# Patient Record
Sex: MALE | Race: WHITE
[De-identification: names, ages, dates, MRNs, and addresses within clinical notes are randomized per-mention and may not be internally consistent; named-entity substitution may affect disease eponyms.]

---

## 2018-07-05 ENCOUNTER — HOSPITAL ENCOUNTER (INPATIENT)
Dept: HOSPITAL 12 - SRC | Age: 23
LOS: 7 days | Discharge: HOME | DRG: 895 | End: 2018-07-12
Attending: INTERNAL MEDICINE | Admitting: INTERNAL MEDICINE
Payer: COMMERCIAL

## 2018-07-05 VITALS — DIASTOLIC BLOOD PRESSURE: 60 MMHG | SYSTOLIC BLOOD PRESSURE: 110 MMHG

## 2018-07-05 VITALS — DIASTOLIC BLOOD PRESSURE: 61 MMHG | SYSTOLIC BLOOD PRESSURE: 105 MMHG

## 2018-07-05 VITALS — WEIGHT: 180 LBS | HEIGHT: 71 IN | BODY MASS INDEX: 25.2 KG/M2

## 2018-07-05 DIAGNOSIS — F32.9: ICD-10-CM

## 2018-07-05 DIAGNOSIS — R94.31: ICD-10-CM

## 2018-07-05 DIAGNOSIS — F11.23: Primary | ICD-10-CM

## 2018-07-05 DIAGNOSIS — L30.9: ICD-10-CM

## 2018-07-05 DIAGNOSIS — F17.210: ICD-10-CM

## 2018-07-05 DIAGNOSIS — F41.0: ICD-10-CM

## 2018-07-05 DIAGNOSIS — F13.230: ICD-10-CM

## 2018-07-05 DIAGNOSIS — I07.1: ICD-10-CM

## 2018-07-05 DIAGNOSIS — E87.6: ICD-10-CM

## 2018-07-05 DIAGNOSIS — E86.0: ICD-10-CM

## 2018-07-05 DIAGNOSIS — Z81.1: ICD-10-CM

## 2018-07-05 DIAGNOSIS — R00.1: ICD-10-CM

## 2018-07-05 LAB
ALP SERPL-CCNC: 67 U/L (ref 50–136)
ALT SERPL W/O P-5'-P-CCNC: 49 U/L (ref 16–63)
AMPHETAMINES UR QL SCN>1000 NG/ML: NEGATIVE
AST SERPL-CCNC: 21 U/L (ref 15–37)
BARBITURATES UR QL SCN: NEGATIVE
BASOPHILS # BLD AUTO: 0 K/UL (ref 0–8)
BASOPHILS NFR BLD AUTO: 0.5 % (ref 0–2)
BILIRUB SERPL-MCNC: 0.5 MG/DL (ref 0.2–1)
BUN SERPL-MCNC: 27 MG/DL (ref 7–18)
CHLORIDE SERPL-SCNC: 104 MMOL/L (ref 98–107)
CO2 SERPL-SCNC: 28 MMOL/L (ref 21–32)
COCAINE UR QL SCN: NEGATIVE
CREAT SERPL-MCNC: 1.1 MG/DL (ref 0.6–1.3)
EOSINOPHIL # BLD AUTO: 0.2 K/UL (ref 0–0.7)
EOSINOPHIL NFR BLD AUTO: 2.8 % (ref 0–7)
ETHANOL SERPL-MCNC: < 3 MG/DL (ref 0–0)
GLUCOSE SERPL-MCNC: 142 MG/DL (ref 74–106)
HCT VFR BLD AUTO: 40.1 % (ref 36.7–47.1)
HGB BLD-MCNC: 14.1 G/DL (ref 12.5–16.3)
LYMPHOCYTES # BLD AUTO: 1.6 K/UL (ref 20–40)
LYMPHOCYTES NFR BLD AUTO: 22.7 % (ref 20.5–51.5)
MAGNESIUM SERPL-MCNC: 2.1 MG/DL (ref 1.8–2.4)
MCH RBC QN AUTO: 34.4 UUG (ref 23.8–33.4)
MCHC RBC AUTO-ENTMCNC: 35 G/DL (ref 32.5–36.3)
MCV RBC AUTO: 97.6 FL (ref 73–96.2)
MONOCYTES # BLD AUTO: 0.4 K/UL (ref 2–10)
MONOCYTES NFR BLD AUTO: 5.2 % (ref 0–11)
NEUTROPHILS # BLD AUTO: 4.7 K/UL (ref 1.8–8.9)
NEUTROPHILS NFR BLD AUTO: 68.8 % (ref 38.5–71.5)
OPIATES UR QL SCN: NEGATIVE
PCP UR QL SCN>25 NG/ML: NEGATIVE
PLATELET # BLD AUTO: 191 K/UL (ref 152–348)
POTASSIUM SERPL-SCNC: 3.4 MMOL/L (ref 3.5–5.1)
RBC # BLD AUTO: 4.1 MIL/UL (ref 4.06–5.63)
THC UR QL SCN>50 NG/ML: POSITIVE
WBC # BLD AUTO: 6.9 K/UL (ref 3.6–10.2)
WS STN SPEC: 6.5 G/DL (ref 6.4–8.2)

## 2018-07-05 PROCEDURE — HZ2ZZZZ DETOXIFICATION SERVICES FOR SUBSTANCE ABUSE TREATMENT: ICD-10-PCS | Performed by: INTERNAL MEDICINE

## 2018-07-05 PROCEDURE — A4663 DIALYSIS BLOOD PRESSURE CUFF: HCPCS

## 2018-07-05 PROCEDURE — G0480 DRUG TEST DEF 1-7 CLASSES: HCPCS

## 2018-07-05 RX ADMIN — METHOCARBAMOL TABLETS PRN MG: 750 TABLET, COATED ORAL at 21:47

## 2018-07-05 RX ADMIN — ONDANSETRON PRN MG: 4 TABLET, ORALLY DISINTEGRATING ORAL at 21:46

## 2018-07-05 NOTE — NUR
PRN Ativan/Robaxin/Zofran

Patient presented with sweating, chills, nausea, fine tremors 6/10 generalized body aches, 
mild headache, anxiety & irritability. PRN PRN Ativan, Robaxin and Zofran administered as 
ordered. Will monitor for effectiveness of medication.

## 2018-07-05 NOTE — NUR
Client is difficult to arouse. RR 16, spO2 @ 98% on RA. Call light within reach. Will 
continue to monitor.

CN notified, she stated to endorse K-Dur to incoming nurse, if client does not wake up 
before shift ends.

## 2018-07-05 NOTE — NUR
PRN Reassessment

Patient verbalized decreased in anxiety & agitation, improved nausea noted and decreased in 
pain from 6/10 to 3/10 after 1 hour of medication administration. Patient in bed and appears 
comfortable. Safety measures in place. Will continue to monitor patient.

## 2018-07-05 NOTE — NUR
PRE-ASSESSMENT:

Pre-Assessment done at intake office, client is A/O x4, he presents with flat affect, 
depressed mood, and flushed face. Client is sitting in chair, slouched shoulders, covering 
face with both hands, he is wearing long sleeve shirt and shorts, he appears nourished. 
Client appears intoxicated.  Client avoids eye contact,  delayed, soft speech,  T 97.6 , RR 
18, /52, HR 65, spO2 @ 98%  on RA, Pain 0/10. He is fully ambulatory. He denies any 
allergies; he denies any withdrawal-induced seizure, no hx of overdose. PMH: Depression 
(2009), Eczema (2008). 

Denies any Past Surgical history.

Client denies any suicidal/homicidal ideation at this time and reports no hx of of SI/HI.  

Home medications  Alprazolam and Lorazepam, client did not bring prescription bottles and 
does not remember the ordered dose. 

Substance  history 

First time used at 15 y/o, Prescribed Alprazolam, client eventually needed more and more, 
for the last 2 weeks he is taking 20mg PO, last time used 10mg on day of admission @ 0200. 

First time used prescribed Lorazepam was at age 16. He continue to take medication now he 
takes from 5-10mg PO daily for the past 2 weeks, last time used 5mg PO on day of admission @ 
0200. 

First time used Roxicodone was at age 18. He takes 5 pills (30mg) PO & snorted daily for the 
past 2 weeks, last time used 60mg PO on day of admission @ 0200. 

First time used Marijuana was at age 16. He smokes 2 gm daily, last time used on day of 
admission 1300.  

Protocol regarding vitals Q4H, UDS, blood work, and controlled substances discuss with 
client, he verbalized understanding.   

Client denies a PCP at this time and he does not know the name of his new psychiatrist.

## 2018-07-05 NOTE — NUR
Admissions Note

22 year old male admitted to [facility] for withdrawal from benzodiazepine and opioids. He stated, 
"I have a drug problem."

Client is oriented to unit, educated about protocols and how to work TV and call light in 
his room.

Weight: 180 pounds. Height: 5'11" 

Client noted with crusty bumps on bilateral knee, he reports itchiness. Well lealed 
superficial scab on R side of forehead, no treatment needed.  Bilateral lung clear on 
auscultation, abdomen soft, non-tender, no edema noted.  

Client has NKDA, regular diet, full code ordered.

LBM was 07/04/08, medium/brown/hard. He declines  PNA vaccine, stating, "No, I don't need 
it." He gives verbal consent for HIV.

Client states that he lives by himself.

He reports history of prior treatment. 

Client attended [facility] on January, 2015 for 30 days.

[facility] on Jun 1-Aug 31, 2016

[facility] from Jan-May, 2018.

His longest period of sobriety is for 11 months, on 2017. 

Client stated, the reason why he relapses, "I tend to isolate myself and stop doing what I 
know it works, like going to groups."

 Client has not try to stop taking alprazolam, lorazepam, and Roxicodone because he stated,  
"I feel anxious, restless, irritable, and a panic attacks"

Client reports withdrawal symptoms, "I get chills, really bad, Panic attacks, nausea, sweats 
really bad, body aches and stuff, I feel a lot more depressed and anxious, headache, pins 
and needle feeling all over my body."

When asked client how this treatment will be different he stated, "I do not know, I wish 
knew, but I don't."

Client reports that the reason why he continues to use substances is, "I try to numb 
everything out." He wants to be able to become sober for himself and his family, his parents 
are very supportive of him. He reports the barriers to become sober are "Just up to me I 
guess, I have tried really hard, but I get so anxious, I can not deal with it and that's 
when I start using again." Client denies been in half-way. 

Dr. Bethea notified of client's admission. Urine was collected. All safety measures 
instituted. Universal precaution.  Call light within reach. Will continue to monitor.

## 2018-07-05 NOTE — NUR
Start of Shift Note:

Patient is a 22 y.o male admitted today 07/05/18 for medically supervised withdrawal from 
Opiate and Benzo use. Patient is alert & oriented to name, place & situation. He has a flat 
affect, appears drowsy, poor eye contact and concentration. He is disheveled, unkempt & with 
dirty fingernails noted. He complains of sweating, chills, 6/10 generalized body aches, mild 
headache, nausea & anxiety. Patient placed on a 5-day Subutex and 6-day Phenobarbital taper 
to be started tomorrow. Encourage pt to increase fluid intake for hydration. Educated 
patient of current plan of care for the night and medication regimen. Safety precaution in 
place. Bed locked in lowest position. Both side rails up. Call light within pt's reach. Will 
continue to monitor patient.

## 2018-07-06 VITALS — SYSTOLIC BLOOD PRESSURE: 109 MMHG | DIASTOLIC BLOOD PRESSURE: 65 MMHG

## 2018-07-06 VITALS — SYSTOLIC BLOOD PRESSURE: 95 MMHG | DIASTOLIC BLOOD PRESSURE: 56 MMHG

## 2018-07-06 VITALS — SYSTOLIC BLOOD PRESSURE: 107 MMHG | DIASTOLIC BLOOD PRESSURE: 62 MMHG

## 2018-07-06 VITALS — SYSTOLIC BLOOD PRESSURE: 92 MMHG | DIASTOLIC BLOOD PRESSURE: 43 MMHG

## 2018-07-06 VITALS — SYSTOLIC BLOOD PRESSURE: 116 MMHG | DIASTOLIC BLOOD PRESSURE: 63 MMHG

## 2018-07-06 VITALS — DIASTOLIC BLOOD PRESSURE: 64 MMHG | SYSTOLIC BLOOD PRESSURE: 115 MMHG

## 2018-07-06 VITALS — SYSTOLIC BLOOD PRESSURE: 109 MMHG | DIASTOLIC BLOOD PRESSURE: 62 MMHG

## 2018-07-06 VITALS — DIASTOLIC BLOOD PRESSURE: 58 MMHG | SYSTOLIC BLOOD PRESSURE: 112 MMHG

## 2018-07-06 PROCEDURE — HZ41ZZZ GROUP COUNSELING FOR SUBSTANCE ABUSE TREATMENT, BEHAVIORAL: ICD-10-PCS

## 2018-07-06 RX ADMIN — PHENOBARBITAL SCH MG: 60 TABLET ORAL at 22:25

## 2018-07-06 RX ADMIN — PHENOBARBITAL SCH MG: 60 TABLET ORAL at 08:23

## 2018-07-06 RX ADMIN — METHOCARBAMOL TABLETS PRN MG: 500 TABLET, COATED ORAL at 22:54

## 2018-07-06 RX ADMIN — METHOCARBAMOL TABLETS PRN MG: 750 TABLET, COATED ORAL at 08:23

## 2018-07-06 RX ADMIN — BUPRENORPHINE HYDROCHLORIDE SCH MG: 2 TABLET SUBLINGUAL at 22:31

## 2018-07-06 RX ADMIN — BUPRENORPHINE HYDROCHLORIDE SCH MG: 2 TABLET SUBLINGUAL at 17:00

## 2018-07-06 RX ADMIN — IBUPROFEN PRN MG: 600 TABLET, FILM COATED ORAL at 22:25

## 2018-07-06 RX ADMIN — PHENOBARBITAL SCH MG: 60 TABLET ORAL at 17:00

## 2018-07-06 RX ADMIN — BUPRENORPHINE HYDROCHLORIDE SCH MG: 2 TABLET SUBLINGUAL at 12:32

## 2018-07-06 RX ADMIN — BUPRENORPHINE HYDROCHLORIDE SCH MG: 2 TABLET SUBLINGUAL at 08:59

## 2018-07-06 RX ADMIN — IBUPROFEN PRN MG: 600 TABLET, FILM COATED ORAL at 08:23

## 2018-07-06 RX ADMIN — PHENOBARBITAL SCH MG: 60 TABLET ORAL at 12:32

## 2018-07-06 NOTE — NUR
CN notified of verbal order from Dr. Bethea to hold all the medications until after been seen 
by the cardiologist.

## 2018-07-06 NOTE — NUR
Results of ECG given to Dr. Bethea. 

Marked Sinus Bradycardia

T wave abnormality, consider anterior ischemia

Abnormal ECG

Vent rate 45bpm

WA Interval 172ms

QRS duration 92ms

QT/QTc 464/401ms

P-R-T axes 60 70 54

No new orders at this time. Client is in bed, a/o x 4, he denies any chest pain or SOB.

## 2018-07-06 NOTE — NUR
Per Dr. Bethea to hold all the medications at this time, he is at bedside with client at this 
time.

## 2018-07-06 NOTE — NUR
MD Communication

Dr. Bethea on the unit and assessed patient. MD with orders to give scheduled medications for 
tonight and to follow parameters as ordered. Pt still has to be seen by cardiologist.

## 2018-07-06 NOTE — NUR
START OF SHIFT 

Client is laying in bed in a fetal position,  a/o x 4, client presents with anxious mood, 
flat affect, flushed face, clammy skin, goosebump, enlarged pupils, tremors, and difficulty 
concentrating. Client reports feeling a sense of panic, sweaty, shaking, headache, nausea, 
decreased appetite, abdominal cramps, and fatigue. Encourage client to increase PO fluid to 
facilitated detox and rehydration. Encourage client to participate in group therapy. Last 
WA 11/ COWS 8 @ 0400. Client slept 6 hrs. Seizure precautions rendered. Call light within 
reach.

## 2018-07-06 NOTE — NUR
PRN Reassessment

Patient verbalized relief from headache after 1 hour of medication administration. Safety 
measures in place. Will continue to monitor patient.

## 2018-07-06 NOTE — NUR
PRN Robaxin

Patient complained of restless legs and 5/10 muscle aches. PRN Robaxin administered as 
ordered. Will monitor for effectiveness of medication.

## 2018-07-06 NOTE — NUR
END OF SHIFT 

Client in in room, a/o x 4, client continues to presents with anxious mood, flat affect, 
flushed face, clammy skin, enlarged pupils, tremors, and difficulty concentrating. Adequate 
PO fluid intake 1977mL, void x 3. Client consumes 50% of meals. Client is not compliant with 
group therapy. PRN administered and noted per protocol. Last CIWA 14/ COWS 14 @ 1700. Per 
Dr. Bethea to hold all the medications until patient seen by a cardiologists. Seizure 
precautions rendered. Call light within reach.

## 2018-07-06 NOTE — NUR
Reassess PRN Motrin 600mg, Tylenol 650mg, Robaxin 750mg, client reports not feeling much of 
a relief from generalized body aches and myalgia 4/10. Call light within reach.

## 2018-07-06 NOTE — NUR
End of Shift Note:

Patient is a 22 y.o male admitted yesterday for Opiate and Benzo use. He remains alert & 
oriented to name, place & situation. He presented with sweating, chills, mylagia, headache, 
nausea & anxiety during my shift. Last night, pt received PRN Ativan for anxiety & 
agitation, Robaxin for myalgia and Zofran for nausea. He will start his 6-day Phenobarbital 
& 5-day Subutex taper today. Pt remained stable throughout my shift. Last COWS 8 CIWA 
11.Continue to closely monitor patient.  Encourage pt to increase fluid intake. Pt slept for 
a total of 6 hours. Fluid intake 500 ml, Voided 1x with no bowel movement. All needs 
attended. Safety measures in place. Will endorse pt to day shift nurse.

## 2018-07-06 NOTE — NUR
Client declined PPD test stating, "i just had the test done this year, it was negative." 
client denies any cough, or chest pain. MD and CN notified.

## 2018-07-06 NOTE — NUR
Start of Shift Note:

Patient is a 22 y.o male admitted today 07/05/18 for medically supervised withdrawal from 
Opiate and Benzo use. Patient is alert & oriented to name, place & situation. He has a flat 
affect, with anxious and depressed mood. He is disheveled, unkempt & with dirty fingernails 
noted. He presented with sweating, chills, 4/10 generalized body aches, mild headache, 
nausea & anxiety. Last COWS 14 CIWA 14. Patient started on a 5-day Subutex and 6-day 
Phenobarbital taper. Pt had an EKG and echocardiogram done d/t low heart rate. Dr. Bethea 
with orders to hold all medications until seen by a cardiologist. Encourage pt to attend 
group therapy & to increase fluid intake for hydration. Educated patient of current plan of 
care for the night and medication regimen. Safety precaution in place. Bed locked in lowest 
position. Both side rails up. Call light within pt's reach. Will continue to monitor 
patient.

## 2018-07-06 NOTE — NUR
PRN Motrin

Patient complained of mild headache. PRN Motrin administered as ordered. Will monitor for 
effectiveness of medication.

## 2018-07-06 NOTE — NUR
PRN Motrin 600mg PO, Tylenol 650mg PO, Robaxin 750mg Po administered for generalized body 
aches and myalgia 6/10. Call light within reach.

## 2018-07-07 VITALS — DIASTOLIC BLOOD PRESSURE: 56 MMHG | SYSTOLIC BLOOD PRESSURE: 110 MMHG

## 2018-07-07 VITALS — DIASTOLIC BLOOD PRESSURE: 63 MMHG | SYSTOLIC BLOOD PRESSURE: 116 MMHG

## 2018-07-07 VITALS — SYSTOLIC BLOOD PRESSURE: 108 MMHG | DIASTOLIC BLOOD PRESSURE: 59 MMHG

## 2018-07-07 VITALS — SYSTOLIC BLOOD PRESSURE: 112 MMHG | DIASTOLIC BLOOD PRESSURE: 64 MMHG

## 2018-07-07 VITALS — DIASTOLIC BLOOD PRESSURE: 52 MMHG | SYSTOLIC BLOOD PRESSURE: 98 MMHG

## 2018-07-07 VITALS — DIASTOLIC BLOOD PRESSURE: 53 MMHG | SYSTOLIC BLOOD PRESSURE: 109 MMHG

## 2018-07-07 LAB
ALP SERPL-CCNC: 70 U/L (ref 50–136)
ALT SERPL W/O P-5'-P-CCNC: 53 U/L (ref 16–63)
AST SERPL-CCNC: 23 U/L (ref 15–37)
BASOPHILS # BLD AUTO: 0 K/UL (ref 0–8)
BASOPHILS NFR BLD AUTO: 0.4 % (ref 0–2)
BILIRUB SERPL-MCNC: 0.6 MG/DL (ref 0.2–1)
BUN SERPL-MCNC: 14 MG/DL (ref 7–18)
CHLORIDE SERPL-SCNC: 98 MMOL/L (ref 98–107)
CO2 SERPL-SCNC: 31 MMOL/L (ref 21–32)
CREAT SERPL-MCNC: 1 MG/DL (ref 0.6–1.3)
EOSINOPHIL # BLD AUTO: 0.2 K/UL (ref 0–0.7)
EOSINOPHIL NFR BLD AUTO: 2.9 % (ref 0–7)
GLUCOSE SERPL-MCNC: 97 MG/DL (ref 74–106)
HCT VFR BLD AUTO: 43.1 % (ref 36.7–47.1)
HGB BLD-MCNC: 14.8 G/DL (ref 12.5–16.3)
LYMPHOCYTES # BLD AUTO: 2.1 K/UL (ref 20–40)
LYMPHOCYTES NFR BLD AUTO: 27.9 % (ref 20.5–51.5)
MCH RBC QN AUTO: 33.5 UUG (ref 23.8–33.4)
MCHC RBC AUTO-ENTMCNC: 34 G/DL (ref 32.5–36.3)
MCV RBC AUTO: 97.6 FL (ref 73–96.2)
MONOCYTES # BLD AUTO: 0.5 K/UL (ref 2–10)
MONOCYTES NFR BLD AUTO: 6.2 % (ref 0–11)
NEUTROPHILS # BLD AUTO: 4.7 K/UL (ref 1.8–8.9)
NEUTROPHILS NFR BLD AUTO: 62.6 % (ref 38.5–71.5)
PLATELET # BLD AUTO: 204 K/UL (ref 152–348)
POTASSIUM SERPL-SCNC: 3.8 MMOL/L (ref 3.5–5.1)
RBC # BLD AUTO: 4.42 MIL/UL (ref 4.06–5.63)
WBC # BLD AUTO: 7.5 K/UL (ref 3.6–10.2)
WS STN SPEC: 7.3 G/DL (ref 6.4–8.2)

## 2018-07-07 RX ADMIN — BUPRENORPHINE HYDROCHLORIDE SCH MG: 2 TABLET SUBLINGUAL at 08:39

## 2018-07-07 RX ADMIN — PHENOBARBITAL SCH MG: 60 TABLET ORAL at 20:40

## 2018-07-07 RX ADMIN — BUPRENORPHINE HYDROCHLORIDE SCH MG: 2 TABLET SUBLINGUAL at 20:40

## 2018-07-07 RX ADMIN — ONDANSETRON PRN MG: 4 TABLET, ORALLY DISINTEGRATING ORAL at 20:40

## 2018-07-07 RX ADMIN — PHENOBARBITAL SCH MG: 60 TABLET ORAL at 08:39

## 2018-07-07 RX ADMIN — METHOCARBAMOL TABLETS PRN MG: 500 TABLET, COATED ORAL at 08:38

## 2018-07-07 RX ADMIN — METHOCARBAMOL TABLETS PRN MG: 500 TABLET, COATED ORAL at 20:40

## 2018-07-07 RX ADMIN — IBUPROFEN PRN MG: 600 TABLET, FILM COATED ORAL at 08:38

## 2018-07-07 RX ADMIN — PHENOBARBITAL SCH MG: 60 TABLET ORAL at 14:02

## 2018-07-07 RX ADMIN — BUPRENORPHINE HYDROCHLORIDE SCH MG: 2 TABLET SUBLINGUAL at 14:03

## 2018-07-07 NOTE — NUR
REASSESS IBUPROFEN- PT STATES PAIN DECREASED TO #3/10. MEDICATION EFFECTIVE.

REASSESS ROBAXIN- PT STATES BODY ACHES IMPROVED AND HE FEELS BETTER. MEDICATION EFFECTIVE.

## 2018-07-07 NOTE — NUR
End of Shift Note:

Patient is a 22 y.o male admitted on 07/05/18 for Opiate and Benzo use. He remains alert & 
oriented to name, place & situation. He presented with sweating, chills, 4/10 generalized 
body aches, mild headache, nausea & anxiety. Last night, Dr. Bethea gave an okay to give pt 
medications.  He received PRN Motrin for headache and Robaxin for myalgia and were 
effective. Pt still has to be seen by a cardiologist. Pt is on a Phenobarbital & Subutex 
taper. Last COWS 11 CIWA 13. Pt remained stable throughout my shift. Pt remained compliant 
with treatment & medication regimen. Continue to closely monitor patient.  Encourage pt to 
increase fluid intake. Pt slept for a total of 4 hours. Fluid intake 1000 ml, Voided 1x with 
no bowel movement. All needs attended. Safety measures in place. Will endorse pt to day 
shift nurse.

## 2018-07-07 NOTE — NUR
COWS 14 AND CIWA 18



Pt presents with anxiety, restlessness, nausea, tremors, chills, irritability, sensitivity 
to sounds, photosensitivity, body aches, intermittent headache, agitation, flat affect, 
stuffy nose, and itching/pins/needles sensations, and intermittent sweats.

## 2018-07-07 NOTE — NUR
COWS 13, CIWA 16. PT presents with sweats, chills, moderate observable anxiousness, moderate 
restless legs and arms, moderate generalized body aches and pain, nasal stuffiness, gross 
tremors, and yawning. Pt continues to have nausea but declines Zofran at this time.

## 2018-07-07 NOTE — NUR
PRN ROBAXIN REASSESSMENT



Pt reports improvement in body aches to 2/10. Safety measures in place. Call light within 
reach. Will continue to monitor.

## 2018-07-07 NOTE — NUR
COWS 12, CIWA 15. PT presents with sweats, chills, moderate observable anxiousness, moderate 
restless legs and arms, moderate generalized body aches and pain, nasal stuffiness, gross 
tremors, nausea and yawning. Pt declines Zofran at this time.

## 2018-07-07 NOTE — NUR
PRN ZOFRAN AND ROBAXIN ADMINISTRATION



Pt reports nausea without vomiting and body aches 6/10. Safety measures in place. Call light 
within reach. Will continue to monitor.

## 2018-07-07 NOTE — NUR
Assessment

Patient reported that he is feeling much better after he received his taper medications. Pt 
verbalized decreased in restlessness, sweating, chills, & anxiety at this time. Pt still 
presented with stuffy nose, nausea, fine tremors, & myalgia. Offered patient Zofran for 
nausea but refused. COWS 11 CIWA 13 at this time. B/P 116/63, LA 53 at this time. Safety 
measures in place. Will continue to monitor patient.

## 2018-07-07 NOTE — NUR
Start of shift

Patient 23 y/o male admitted for medically supervised withdrawal of benzos, opiates, and 
marijuana. Last COWS 11 and CIWA 13 at 2400. Pt  on a  6 day Phenobarbital and 5 day Subutex 
taper. Taper medications have been held due to low heart rate. MD chu. Cardiology consult 
scheduled for today.   Pt presents with moderate anxiety, mild agitation, restless,  flushed 
face, sweating and chills, generalized body ache s and pains. PRN given last night Motrin 
and Robaxin. Encouraged pt to attend group therapies/sessions to learn new coping skills to 
prevent relapse.  Pt reports NKA, FULL CODE. All safety precautions in place. Call light 
within reach. Bed lowest lock position. Fall precautions. Will continue to monitor for 
withdrawal symptoms.

## 2018-07-07 NOTE — NUR
End of shift

Patient 23 y/o male admitted for medically supervised withdrawal of benzos, opiates, and 
marijuana. Pt  on a  6 day Phenobarbital and 5 day Subutex taper, tolerating well. Heart 
rate remained stable today. Last COWS 13, CIWA 16.   Pt remains with moderate anxiety, mild 
agitation, restless, nausea,   flushed face, sweating and chills, generalized body aches and 
pains. Pt had continuous flat affect and depressed mood throughout the day. PRN given today 
Motrin and Robaxin. Encouraged pt to attend group therapies/sessions to learn new coping 
skills to prevent relapse.  Pt did participate in two group sessions today.  PO fluids 2960 
ml, voids x4, no BM.  Pt reports NKA, FULL CODE. All safety precautions in place. Call light 
within reach. Bed lowest lock position. Fall precautions. Will continue to monitor for 
withdrawal symptoms. Endorsed to PM shift.

## 2018-07-07 NOTE — NUR
PRN TRAZODONE ADMINISTRATION



Pt requests sleep aid. Safety measures in place. Call light within reach. Will continue to 
monitor.

## 2018-07-07 NOTE — NUR
PRN JANUSZ ODT REASSESSMENT



Pt reports nausea improved to tolerable level, able to tolerate fluids. Safety measures in 
place. Call light within reach. Will continue to monitor.

## 2018-07-07 NOTE — NUR
START OF SHIFT



Pt is a 23 y/o male admitted on 07/06/18 for benzo and opiate withdrawal. Pt is on a 6 day 
Phenobarbital and 5 day Subutex taper, tolerating well. Last COWS 13 and CIWA 16 and PRN 
Motrin and Robaxin administered during day shift. Upon assessment pt presents with anxiety, 
restlessness, nausea, tremors, chills, sensitivity to sounds, photosensitivity, difficulty 
falling asleep,  body aches, intermittent headache, agitation, flat affect, stuffy nose, and 
itching/pins/needles sensations, and intermittent sweats. Medications due. Safety measures 
in place. Call light within reach. Will continue to monitor.

## 2018-07-07 NOTE — NUR
COWS 13, CIWA 16. PT presents with sweats, chills, moderate observable anxiousness, moderate 
restless legs and arms, moderate generalized body aches and pain, nasal stuffiness, gross 
tremors, nausea and yawning. Pt declines Zofran at this time.

## 2018-07-08 VITALS — SYSTOLIC BLOOD PRESSURE: 108 MMHG | DIASTOLIC BLOOD PRESSURE: 62 MMHG

## 2018-07-08 VITALS — SYSTOLIC BLOOD PRESSURE: 114 MMHG | DIASTOLIC BLOOD PRESSURE: 65 MMHG

## 2018-07-08 VITALS — SYSTOLIC BLOOD PRESSURE: 117 MMHG | DIASTOLIC BLOOD PRESSURE: 64 MMHG

## 2018-07-08 VITALS — DIASTOLIC BLOOD PRESSURE: 66 MMHG | SYSTOLIC BLOOD PRESSURE: 111 MMHG

## 2018-07-08 PROCEDURE — HZ31ZZZ INDIVIDUAL COUNSELING FOR SUBSTANCE ABUSE TREATMENT, BEHAVIORAL: ICD-10-PCS

## 2018-07-08 RX ADMIN — BUPRENORPHINE HYDROCHLORIDE SCH MG: 2 TABLET SUBLINGUAL at 21:03

## 2018-07-08 RX ADMIN — QUETIAPINE PRN MG: 25 TABLET, FILM COATED ORAL at 21:47

## 2018-07-08 RX ADMIN — BUPRENORPHINE HYDROCHLORIDE SCH MG: 2 TABLET SUBLINGUAL at 15:06

## 2018-07-08 RX ADMIN — PHENOBARBITAL SCH MG: 60 TABLET ORAL at 13:16

## 2018-07-08 RX ADMIN — ONDANSETRON PRN MG: 4 TABLET, ORALLY DISINTEGRATING ORAL at 10:26

## 2018-07-08 RX ADMIN — PHENOBARBITAL SCH MG: 60 TABLET ORAL at 21:03

## 2018-07-08 RX ADMIN — METHOCARBAMOL TABLETS PRN MG: 500 TABLET, COATED ORAL at 08:06

## 2018-07-08 RX ADMIN — PHENOBARBITAL SCH MG: 60 TABLET ORAL at 17:12

## 2018-07-08 RX ADMIN — PHENOBARBITAL SCH MG: 60 TABLET ORAL at 08:06

## 2018-07-08 RX ADMIN — ONDANSETRON PRN MG: 4 TABLET, ORALLY DISINTEGRATING ORAL at 15:05

## 2018-07-08 NOTE — NUR
Start of shift note

Received report from day shift nurse.  Pt is a 23 yo male, A+Ox4, presenting to [facility] for Benzo/Opiate withdrawal.  Pt noted with agitation, anxiety, and restlessness.  
Pt has HX of Depression and eczema which will be monitored during shift.  Pt is on 6 day 
Phenobarbital and 5 day Subutex tapers, tolerated well.  Respirations even and unlabored.  
Will continue to monitor.

## 2018-07-08 NOTE — NUR
START OF SHIFT

Client is in hallway, conversing with a peer, client appears disheveled, dark circles under 
eyes, dry lips, he presents with depressed mood, clammy skin, goosebump,tremors, enlarged 
pupil, and difficulty concentrating. Client reports generalized body aches, nausea, 
decreased appetite, abdominal cramps, sweats, shaking, feelings of despair, and fatigue. 
Encourage client to increase PO fluid intake as tolerated. Encourage client to attend group 
therapy to learn skills to maintain sober. PRN administered overnight and noted per 
protocol. Last WA 18/COWS 14 @ 2000. Seizure precautions rendered. Call light within 
reach.

## 2018-07-08 NOTE — NUR
Reassess PRN Zofran 4mg, client verbalized relief from intermittent nausea, no episodes of 
emesis. Call light within reach.

## 2018-07-08 NOTE — NUR
CIWA 15/COWS 13 reports nausea, abdominal cramps, sweats, tremors, enlarged pupil, feelings 
of despair, anxiety, agitation, and fatigue.

## 2018-07-08 NOTE — NUR
END OF SHIFT 

Client in in room, a/o x 4, client continues to presents with anxious mood, flat affect, 
flushed face, clammy skin, enlarged pupils, and tremors. Adequate PO fluid intake 1925mL, 
void x 3, stool x1. Client consumes 75% of meals. Client is compliant with group therapy. 
PRN administered and noted per protocol. Last CIWA 15/ COWS 13 @ 1700. Per Dr. Bethea 
cardiologist will visit client on Monday. Seizure precautions rendered. Call light within 
reach. 

-------------------------------------------------------------------------------

Addendum: 07/08/18 at 1927 by AUDIE FLORES RN

-------------------------------------------------------------------------------

Per Dr. Solo

## 2018-07-08 NOTE — NUR
END OF SHIFT



Pt is a 21 y/o male admitted on 07/06/18 for benzo and opiate withdrawal. Pt is on a 6 day 
Phenobarbital and 5 day Subutex taper, tolerating well. Pt presented with anxiety, 
restlessness, nausea, tremors, chills, sensitivity to sounds, photosensitivity, difficulty 
falling and staying asleep, body aches, intermittent headache, agitation, flat affect, 
stuffy nose, and itching/pins/needles sensations, and intermittent sweats. Scheduled 
medications and PRN Zofran odt, Robaxin and Trazodone administered, effective in S/S of 
withdrawal as verbalized by pt.  Last COWS 14 and CIWA 18. Pt slept 8 hours intermittently. 
Pt reports that he was tossing and turning all night. Intake 1700 ml, void x 4, stool x 0. 
Safety measures in place. Call light within reach. Pts needs have been met. Endorsed to day 
shift nurse. 

-------------------------------------------------------------------------------

Addendum: 07/08/18 at 0715 by MADY TAFOYA RN

-------------------------------------------------------------------------------

Correction: Last COWS 12 and CIWA 13

## 2018-07-08 NOTE — NUR
CIWA 17/COWS 15 reports generalized body aches, nausea, abdominal cramps, sweats, tremors, 
feelings of despair, anxiety, agitation,and fatigue.

## 2018-07-08 NOTE — NUR
PRN TRAZODONE REASSESSMENT



Pt laying in bed with eyes closed, medication noted effective. Respirations even and 
unlabored. Safety measures in place. Call light within reach. Will continue to monitor.

## 2018-07-08 NOTE — NUR
PRN Seroquel Reassessment

Medication effective.  Pt is resting well in bed.  No s/s of ASE noted at this time.  
Respirations even and unlabored.  Will continue to monitor.

## 2018-07-08 NOTE — NUR
CIWA 16/COWS 14 reports nausea, abdominal cramps, sweats, tremors, feelings of despair, 
anxiety, agitation,and fatigue.

## 2018-07-08 NOTE — NUR
COWS and CIWA note

Pt noted with flushed face, pupils larger than normal, stuffy nose/moist eyes, nausea, 
tremors that can only be felt and not seen, report of increased anxiousness, agitation, and 
piloerection of skin.  COWS: 11 and CIWA: 11.

## 2018-07-08 NOTE — NUR
PRN Seroquel

Pt c/o inability to sleep and requested for PRN Seroquel.  Medication given and tolerated 
well.  Will reassess within 1 HR.  Will continue to monitor.

## 2018-07-08 NOTE — NUR
PRN Zofran 4mg SL administered for intermittent nausea, no episodes of emesis. Call light 
within reach.

## 2018-07-08 NOTE — NUR
COWS/CIWA DEFERRED AND VITALS REFUSED



Pt laying in bed with eyes closed, COWS/CIWA deferred, to be assessed when pt is awake per 
orders. Vitals refused. Respirations even and unlabored. Safety measures in place. Call 
light within reach. Will continue to monitor. 

-------------------------------------------------------------------------------

Addendum: 07/08/18 at 0428 by MADY TAFOYA RN

-------------------------------------------------------------------------------

TIME 0400

## 2018-07-09 VITALS — SYSTOLIC BLOOD PRESSURE: 121 MMHG | DIASTOLIC BLOOD PRESSURE: 77 MMHG

## 2018-07-09 VITALS — SYSTOLIC BLOOD PRESSURE: 125 MMHG | DIASTOLIC BLOOD PRESSURE: 62 MMHG

## 2018-07-09 VITALS — SYSTOLIC BLOOD PRESSURE: 112 MMHG | DIASTOLIC BLOOD PRESSURE: 78 MMHG

## 2018-07-09 VITALS — DIASTOLIC BLOOD PRESSURE: 73 MMHG | SYSTOLIC BLOOD PRESSURE: 124 MMHG

## 2018-07-09 RX ADMIN — PHENOBARBITAL SCH MG: 60 TABLET ORAL at 14:47

## 2018-07-09 RX ADMIN — PHENOBARBITAL SCH MG: 60 TABLET ORAL at 20:42

## 2018-07-09 RX ADMIN — BUPRENORPHINE HYDROCHLORIDE SCH MG: 2 TABLET SUBLINGUAL at 08:26

## 2018-07-09 RX ADMIN — PHENOBARBITAL SCH MG: 60 TABLET ORAL at 08:25

## 2018-07-09 RX ADMIN — BUPRENORPHINE HYDROCHLORIDE SCH MG: 2 TABLET SUBLINGUAL at 20:43

## 2018-07-09 RX ADMIN — QUETIAPINE PRN MG: 25 TABLET, FILM COATED ORAL at 21:43

## 2018-07-09 RX ADMIN — BUPRENORPHINE HYDROCHLORIDE SCH MG: 2 TABLET SUBLINGUAL at 14:47

## 2018-07-09 NOTE — NUR
PRN  SEROQUEL 50 MG  PO ADMINISTRATION.

Patient c/o insomnia and asked aid.  Seroquel 50 mg PO administrated PRN with full glass of 
water as ordered.  Patient tolerated well.  All needs met.  Safe and calm environment with 
minimized noises was provided.  Safety measures on place.  Call light within reach, bed in 
lowest position locked, padded rails up bilaterally.  Will continue to monitor closely.

## 2018-07-09 NOTE — NUR
COWS score is 8 and CIWA score is 12 at this time. Pt is noted with anxiety, agitation,  
sweats,  and restless. Pt is c/o sensitivity to light, stuffy nose and itchiness on 
extremities.

## 2018-07-09 NOTE — NUR
COWS score is 8 and CIWA score is 9 at this time. Pt is noted with anxiety, agitation,  
sweats,  and restless. Pt continues to c/o sensitivity to light and stuffy nose.

## 2018-07-09 NOTE — NUR
START OF SHIFT NOTE:

Endorsed patient is a 22 year old male tolerated well with ordered 6 Day Phenobarbital and 5 
Day Subutex Tapers for  Benzodiazepines (Xanax, Ativan),Opioid (Oxycodone) withdrawal.  The 
patient is alert and oriented x4, ambulatory with stable gait.  He is cooperative, and has 
clear speech. The patient appears worried, with flat affect and liable mood.  Emotional 
support and reassuring provided.  The patient encouraged to express his feelings.  
Withdrawal symptoms will be closely monitored.  The most recent COWS=8,CIWA=12 at 1600: 
During day shift patient c/o anxiety, agitation, nervousness, irritability, generalized body 
aches, restlessness, fatigue, sweating, nasal congestion, and very mild lightheaded.  No PRN 
 Medications administrated during day shift.   The patient remains compliant with treatment, 
medications, and diet regime.  Encouraged to fluids intake as tolerated.   Encouraged to 
attend group activities.  Safe and calm environment with minimized noises was provided.   
All needs met.  Safety measures: Call light within reach, bed is locked in lowest position, 
and padded bed rails up bilaterally.  The patient endorsed by outgoing day shift nurse.  
Will continue to monitor closely.

## 2018-07-09 NOTE — NUR
End of shift note

Pt was continuously noted with anxiety, agitation, restlessness, piloerection of skin, 
nausea, and stuffy nose/moist eyes.  Pt remained in room in room for majority of shift 
except to get food from kitchen and to go smoke on smoking patio.  Pt remained cooperative 
and compliant with all aspects of treatment.  Pt was given PRN Seroquel @2785.  Pt is on 6 
day Phenobarbital and 5 day subutex tapers, tolerated well.  Pt slept for a total of 6 HRS.  
Last COWS: 11 and Last CIWA: 11 @2000.  Respirations even and unlabored.  Will endorse to 
day shift nurse.

## 2018-07-09 NOTE — NUR
END OF SHIFT

Pt is a 22 yr old male, AA&Ox4. pt was admitted on 7/5/18 for Benzo/Opiate withdrawal and is 
on 6 day Phenobarbital and 5 day Subutex taper as ordered. Pt has been cooperative with 
medication regimen and plan of care. Pt was observed attending group therapy. Pt has been 
c/o anxiety, stuffy nose, teary eyes, restlessness, lack of appetite, headache and 
sensitivity to light and sound. No PRN's were given during the day. Skin is intact, warm and 
moist to touch. Last COWS score was 8 and CIWA score was 12 at 1600.  Safety precautions 
observed. Call light is within reach. Endorsed to night shift nurse to continue with care.

## 2018-07-09 NOTE — NUR
COWS score is 10 and CIWA score is 15 at this time. Pt is noted with increase anxiety, 
agitation, facial sweats, fine tremors are felt and restless. Pt is c/o headache, 
sensitivity to light and stuffy nose.

## 2018-07-09 NOTE — NUR
START OF SHIFT

Pt is a 22 yr old male, AA&Ox4. p twas admitted on 7/5/18 for Benzo/Opiate withdrawal and is 
on 6 day Phenobarbital and 5 day Subutex taper as ordered. Medication antione well. Received 
report from night shift nurse. Pt received Seroquel PRN during the night. Medication was 
effect and slept for 6 hrs. Last COWS score was 11 and CIWA score was 11 at 2000. Pt is 
currently c/o anxiety and "tingling all over" his face. Skin is intact, warm and moist to 
touch. Safety precautions observed. Call light is within reach. Will continue to monitor.

## 2018-07-09 NOTE — NUR
RE-ASSESSMENT

Patient is sleeping.  RR:15.  Respirations even and unlabored.  Seroquel 50 mg PO 
administrated PRN for insomnia at 2143 was effective.  Safe and calm environment with 
minimized noises was provided.   All needs met.  Safety measures: Call light within reach, 
bed in lowest position locked, padded rails up bilaterally.  Will continue to monitor 
closely.

## 2018-07-10 VITALS — DIASTOLIC BLOOD PRESSURE: 50 MMHG | SYSTOLIC BLOOD PRESSURE: 118 MMHG

## 2018-07-10 VITALS — SYSTOLIC BLOOD PRESSURE: 120 MMHG | DIASTOLIC BLOOD PRESSURE: 69 MMHG

## 2018-07-10 VITALS — SYSTOLIC BLOOD PRESSURE: 118 MMHG | DIASTOLIC BLOOD PRESSURE: 68 MMHG

## 2018-07-10 VITALS — SYSTOLIC BLOOD PRESSURE: 139 MMHG | DIASTOLIC BLOOD PRESSURE: 78 MMHG

## 2018-07-10 RX ADMIN — PHENOBARBITAL SCH MG: 60 TABLET ORAL at 08:42

## 2018-07-10 RX ADMIN — QUETIAPINE PRN MG: 25 TABLET, FILM COATED ORAL at 20:53

## 2018-07-10 RX ADMIN — PHENOBARBITAL SCH MG: 60 TABLET ORAL at 20:03

## 2018-07-10 NOTE — NUR
VS REFUSED, CIWA /COWS  DEFERRED0

VS refused, CIWA/COWS deferred at 0400 due to patient sleeping; to be assessed and scored 
while patient is awake.  Respirations are even and unlabored.  RR:14.  Safe and calm 
environment with minimized noises was provided.   All needs met.  Safety measures: Call 
light within reach bed is locked in lowest position, and padded bed rails up bilaterally.

## 2018-07-10 NOTE — NUR
VS REFUSED, COWS/CIWA DEFERRED

VS refused, COWS/CIWA deferred at 0000 d/t pt sleeping, to assess while pt is awake as 
ordered.   Respirations are unlabored and even.  RR:15.  All needs met.  Safe and calm 
environment with minimized noises was provided.  Safety measures on place.  Call light 
within reach, bed in lowest position locked, padded rails up bilaterally.   Will continue to 
monitor closely.

## 2018-07-10 NOTE — NUR
End of Shift

Writer provided report on 22 year old male admitted to [facility] on 7/5/18 for medical 
management of Benzodiazepine, Opiate and Marijuana withdrawals. Pt endorses NKA, full code 
and regular diet. Pt PMH of eczema with a PPH of depression. Pt on a Phenobarbital and 
Subutex taper with Subutex taper to be completed . Pt has tolerated well with last COWS 9 
and Ciwa 10 recorded at 1630. Pt had no PRN medication on this shift. Pt has been anxious 
and restless, diaphoretic and with muscle aches. Flat affect with anxious mood.  Linear 
thought process and clear speech pattern. A/O x4 and able to make needs kown. Bed in low 
position with wheels locked and side rails up x2.

## 2018-07-10 NOTE — NUR
COWS 12/CIWA 10

Pt with complaints of restlessness, anxiety and stomach cramps with muscle aches and spasms. 
Pt is noted to be diaphoretic. Will continue to monitor, support and encourage according to 
plan of care.

## 2018-07-10 NOTE — NUR
START OF SHIFT NOTE

Endorsed patient, 22 year old male  tolerated well with ordered 6 Day Phenobarbital and 5 
day Subutex Tapers, ordered for Benzodiazepines (Xanax, Ativan) and Opioid (Roxicodone) 
withdrawal.  He is resting in the bed.  Patient is alert and oriented x4, with steady gait, 
and soft clear speech.  Patient appears worry with poor eyes contact, and c/o " feelings of 
increased anxiety".  The patient noted with anxious mood and irritable affect.  Emotional 
support provided,  and patient 's reassuring.  Patient denied SI/HI.  Latest COWS=9,CIWA 10 
at 1630: The patient  presented with anxiety, agitation, irritability, myalgia, nervousness, 
nasal congestion, tremors, sweating, restlessness, fatigue, abdominal cramps, nausea, 
generalized body aches, and yawning.   Respirations unlabored and clear.  Abdomen is soft, 
non-tender.  Bowel Sounds active in all four quadrants.  Skin is intact, warm, and dry to 
touch. The patient denies SI/HI.  Encouraged to attend group activities, to increasing oral 
fluids.  Patient remains compliant with treatment, medications, and diet regime per day 
shift nurse report.  All safety measures in the place: Call light within reach, bed locked 
and in the lowest position,  padded rails up x 2.  Patient endorsed by day shift nurse, 
report received.  Will continue to monitor closely.

## 2018-07-10 NOTE — NUR
COWS 9/CIWA 10

Pt remains diaphoretic, anxious and restless with stomach spasms and muscle aches. Will 
continue to monitor, support and encourage according to plan of care.

## 2018-07-10 NOTE — NUR
COWS 10/CIWA 8

Pt continues to complain off restlessness and stomach cramps. Pt noted to be anxious, 
diaphoretic and has a stuffy nose. Will continue to monitor, support and encourage according 
to plan of care.

## 2018-07-10 NOTE — NUR
PRN SEROQUEL PO  AND PRN VISTARIL PO RE-ASSESSMENT.

The patient is sleeping.  RR:14.  Respirations are unlabored and even.  PRN Seroquel 50 mg 
PO administrated  for insomnia  at 2053 and PRN Vistaril 25 mg PO administrated for anxiety 
at 2053, were effective.  Safe and calm environment provided.  All needs met.   Safety 
measures: Call light within reach, bed locked in lowest position, and padded bed rails up 
x2.  Will continue to monitor closely.

## 2018-07-10 NOTE — NUR
PRN SEROQUEL 50 MG PO  AND PRN VISTARIL 25 MG PO ADMINISTRATION.

The patient c/o insomnia and increased anxiety.  PRN Seroquel 50 mg PO administrated for 
insomnia and PRN Vistaril 25 mg PO administrated for anxiety with full glass of water, as 
ordered.  The patient tolerated well.  Safe and calm environment provided.  All needs met.  
Safety measures: Call light within reach, bed locked in lowest position, and padded bed 
rails up x2.  Will continue to monitor closely.

## 2018-07-10 NOTE — NUR
END OF SHIFT NOTE:

The patient is a 22 year old male  presented for Benzodiazepines (Xanax, Ativan),Opioid 
(Oxycodone) withdrawal, tolerated well with ordered 6 Day Phenobarbital and 5 Day Subutex 
Tapers.  No S/S of ASE noted.  Patient remains compliant with treatment, medications, and 
diet regime.  Withdrawal symptoms was closely monitored.  The patient  is alert and 
orientedx4, cooperative.  His mood and affect are anxious and flat.  COWS=10,CIWA=12 at 
2000: The patient presented with anxiety, agitation, nervousness, abdominal cramps, nasal 
congestion, very mild pins and needles sensations, sweating, yawning, restlessness, and 
fatigue.  VS refused, COWS/CIWA deferred for 0000 and 0400 due patient sleeping.  Skin 
remains intact, warm, and dry to touch.  Seroquel 50 mg PO administrated PRN for insomnia at 
2143 was effective.  Encouraged to increase oral fluids intake as tolerated.  Encouraged to 
attend groups activities.  Calm and safety environment with minimized noises was provided.   
Patient slept 7 hours, intake 1,500 ml, voided x3.  All needs met. Safety measures in the 
place: Call light within reach, bed in the lowest position locked, padded rails up x2.  
Patient endorsed to day shift nurse.

## 2018-07-11 VITALS — DIASTOLIC BLOOD PRESSURE: 61 MMHG | SYSTOLIC BLOOD PRESSURE: 104 MMHG

## 2018-07-11 VITALS — DIASTOLIC BLOOD PRESSURE: 62 MMHG | SYSTOLIC BLOOD PRESSURE: 104 MMHG

## 2018-07-11 VITALS — SYSTOLIC BLOOD PRESSURE: 110 MMHG | DIASTOLIC BLOOD PRESSURE: 68 MMHG

## 2018-07-11 VITALS — SYSTOLIC BLOOD PRESSURE: 108 MMHG | DIASTOLIC BLOOD PRESSURE: 68 MMHG

## 2018-07-11 RX ADMIN — IBUPROFEN PRN MG: 600 TABLET, FILM COATED ORAL at 19:21

## 2018-07-11 RX ADMIN — QUETIAPINE PRN MG: 25 TABLET, FILM COATED ORAL at 21:44

## 2018-07-11 NOTE — NUR
Start of Shift

Writer received report on 22 year old male admitted to [facility] on 7/5/18 for medical 
management of Benzodiazepine, Opiate and Marijuana withdrawals. Pt endorses NKA, full code 
and regular diet. Pt PMH of eczema with a PPH of depression. Pt on a Phenobarbital and has 
completed a Subutex taper Pt has tolerated well with last COWS 11 and CIWA 12, per NOC 
report. Pt was administered Seroquel(insomnia) and Vistaril(anxiety) on NOC, per report. 
Writer encounters pt in pts room. Pt is anxious and restless, unable to stand still and 
complains of muscle spasms. Pt agitated about waiting to smoke.Pt with a flat affect and 
depressed mood. Linear thought process and clear speech pattern. A/O x4 and able to make 
needs known. Bed in low position with wheels locked and side rails up x2. Will continue to 
monitor, support and encourage according to plan of care.

## 2018-07-11 NOTE — NUR
PRN Motrin

Pt c/o headache 4/10 and requested for PRN Motrin.  Medication given and tolerated well.  
Will reassess within 1 HR.  Will continue to monitor.

## 2018-07-11 NOTE — NUR
Start of shift note

Received report from day shift nurse.  Pt is a 21 yo male, A+Ox4, presenting to [facility] for Benzo/Opiate withdrawal.  Pt noted to be agitated, anxious, and restless.  Pt 
has HX of Depression and Eczema which will be monitored during shift.  Pt has completed 6 
day Phenobarbital and 5 day Subutex tapers, tolerated well, and is due for discharge 
tomorrow.  Respirations even and unlabored.  Will continue to monitor.

## 2018-07-11 NOTE — NUR
COWS: 5 and CIWA: 7, Pt noted with flushed face, restlessness, mild headache, fine tremors, 
anxiety, agitation, and sweat on forehead.  Respirations even and unlabored.  Will continue 
to monitor.

## 2018-07-11 NOTE — NUR
END OF SHIFT NOTE:

Presented 22 year old male admitted on for Benzodiazepines (Xanax, Ativan), Opioid/Heroin, 
and Methamphetamine withdrawal, continues ordered 6 day Phenobarbital and  5 day Subutex 
taper, which tolerated well.  He is alert and orientedx4 with stable gait.  Speech is soft 
and clear.  The patient appears worry, sad with anxious mood.  Patient noted  unkempt  and  
with uncombed hair.  Educated in safety and hygiene care.  Encouraged to independently 
perform hygiene care.  COWS=11, CIWA=12 @2000.  Patient presented with anxiety, agitation, 
nervousness, restlessness, tremors, fatigue, abdominal cramps, sweating, and insomnia.  VS 
refused,  Cows/CIWA deferred at 0000 and 0400 due to patient sleeping; to be assessed and 
scored while patient is awake.  PRN Seroquel 50 mg PO administrated for insomnia at 2053, 
and PRN Vistaril 25 mg PO  administrated for anxiety at 2053, and were effective.  Patient 
remains compliant with treatment, medications, and diet regime.  Calm and safety environment 
with minimized noises was provided.   Patient slept 8 hours, intake 2,210 ml, voided x6.  
All needs met.  Safety measures in the place: Call light within reach, bed in the lowest 
position locked, padded rails up x2.  The patient endorsed to day shift nurse.

## 2018-07-11 NOTE — NUR
PRN MOM

Pt complains of a little movement yesterday, but has not had a healthy BM in 3 to 4 days. 
Writer administered medication per MD order, with pt tolerating well. Will continue to 
monitor, support and encourage according to plan of care.

## 2018-07-11 NOTE — NUR
COWS 11/CIWA 12

Pt is experiencing tremors, stomach cramps, chills, anxiety, restlessness and headache. Will 
continue to monitor, support and encourage according to plan of care.

## 2018-07-11 NOTE — NUR
COWS 9/CIWA 11

Pt sweating, pt complain of stomach cramps, body aches and is anxious and restless. Will 
continue to monitor, support and encourage according to plan of care

## 2018-07-11 NOTE — NUR
PRN Motrin Reassessment

Medication effective.  Pt expresses reduction in headache to 2/10.  No s/s of ASE noted at 
this time.  Respirations even and unlabored.  Will continue to monitor.

## 2018-07-11 NOTE — NUR
VS REFUSED. COWS/CIWA DEFERRED

VS refused.  COWS/CIWA deferred due to patient sleeping, and to be assessed when the patient 
 is awake.  RR:15. Respirations are unlabored and even.  Safe and calm environment provided. 
 All needs met.  Safety measures: Call light within reach, bed locked in lowest position, 
and padded bed rails up x2.  Will continue to monitor closely.

## 2018-07-12 VITALS — DIASTOLIC BLOOD PRESSURE: 62 MMHG | SYSTOLIC BLOOD PRESSURE: 121 MMHG

## 2018-07-12 NOTE — NUR
BEGINNING OF SHIFT

Patient endorsement report received from night shift nurse, all pertinent information was 
discussed. Patient is a 22 year old male.  admitting With Dx: Opiate/BZO withdrawal. Patient 
completed 6 day phenobarbital and 5 day subutex taper as ordered. Patient is scheduled to be 
discharged this morning, noted self motivated towards sobriety. As per night shift patient 
slept for 6 hours. Received  PRN: Motrin and Seroquel during night shift. Patient with last  
cow score of: 5 and last ciwa score of: 7. Patient received awake, alert and oriented x4, 
patient educated regarding plan of care for the day and medication regimen, will also 
educate regarding all discharge instructions.  will continue to monitor closely. safety 
measures are in place. call light with in reach.

## 2018-07-12 NOTE — NUR
End of shift note

Pt was continuously noted with anxiety, agitation, and restlessness.  Pt remained in room 
for majority of shift except to get food from kitchen and to go smoke on smoking patio.  Pt 
remained compliant and cooperative with all aspects of treatment.  Pt was given PRN Motrin 
@1921 and PRN Seroquel @2144.  Pt is has completed 6 day Phenobarbital and 5 day Subutex 
tapers, tolerated well, and is due for discharge today.  Pt slept for a total of 6 HRS.  
Last COWS: 5 and Last CIWA: 7 @2000.  Respirations even and unlabored.  Will endorse to day 
shift nurse.

## 2018-07-12 NOTE — NUR
DISCHARGE

Patient discharged off the unit at 0950,  in stable condition, not in any apparent acute 
distress. Patient noted self motivated towards sobriety. Patients vital signs WNL. Patient 
with last cow score of: 5, and last ciwa score of: 5. Patient did not bring any home 
medications with him. Patient was educated and provided with teaching regarding all 
discharge instructions with good verbal understanding. Patient off the unit at 0950.

## 2018-07-12 NOTE — NUR
V/S refused and COWS and CIWA deferred for sleep.  Respirations even and unlabored.  Will 
continue to monitor.

## 2020-12-07 NOTE — NUR
END OF SHIFT

Client is in bed, sounds asleep, difficult to arouse. RR 16, spO2 @ 98% on RA. Incoming 
nurse to administer K-dur schedule at 1715 once client is awake, per charge nurse. Call 
light within reach. No Residual Tumor Seen Histology Text: There were no malignant cells seen in the sections examined.

## 2021-09-06 NOTE — NUR
Patient to UC with circular rash on left calf, right flank, top of buttocks and left side of scalp.    VS REFUSED, COWS/CIWA DEFERRED

VS refused, COWS/CIWA deferred at 0400 due to patient sleeping; to be assessed and scored 
while patient is awake.  Respirations are even and unlabored.  RR:17.  All needs met.  
Safety measures in place: Call light within reach, bed locked in low position, padded  side 
rails up x2.  Will continue to monitor closely.